# Patient Record
Sex: FEMALE | Race: WHITE | NOT HISPANIC OR LATINO | Employment: PART TIME | URBAN - METROPOLITAN AREA
[De-identification: names, ages, dates, MRNs, and addresses within clinical notes are randomized per-mention and may not be internally consistent; named-entity substitution may affect disease eponyms.]

---

## 2022-05-26 ENCOUNTER — APPOINTMENT (EMERGENCY)
Dept: ULTRASOUND IMAGING | Facility: HOSPITAL | Age: 22
End: 2022-05-26

## 2022-05-26 ENCOUNTER — HOSPITAL ENCOUNTER (EMERGENCY)
Facility: HOSPITAL | Age: 22
Discharge: HOME | End: 2022-05-27
Attending: EMERGENCY MEDICINE

## 2022-05-26 VITALS
HEART RATE: 76 BPM | TEMPERATURE: 98 F | RESPIRATION RATE: 18 BRPM | OXYGEN SATURATION: 98 % | DIASTOLIC BLOOD PRESSURE: 55 MMHG | SYSTOLIC BLOOD PRESSURE: 107 MMHG

## 2022-05-26 DIAGNOSIS — O03.4 RETAINED PRODUCTS OF CONCEPTION FOLLOWING ABORTION: Primary | ICD-10-CM

## 2022-05-26 LAB
ALBUMIN SERPL BCP-MCNC: 4.3 G/DL (ref 3.5–5)
ALP SERPL-CCNC: 83 U/L (ref 34–104)
ALT SERPL W P-5'-P-CCNC: 8 U/L (ref 7–52)
ANION GAP SERPL CALCULATED.3IONS-SCNC: 5 MMOL/L (ref 4–13)
APTT PPP: 30 SECONDS (ref 23–37)
AST SERPL W P-5'-P-CCNC: 14 U/L (ref 13–39)
BASOPHILS # BLD AUTO: 0.03 THOUSANDS/ΜL (ref 0–0.1)
BASOPHILS NFR BLD AUTO: 0 % (ref 0–1)
BILIRUB SERPL-MCNC: 0.51 MG/DL (ref 0.2–1)
BUN SERPL-MCNC: 10 MG/DL (ref 5–25)
CALCIUM SERPL-MCNC: 9 MG/DL (ref 8.4–10.2)
CHLORIDE SERPL-SCNC: 105 MMOL/L (ref 96–108)
CO2 SERPL-SCNC: 28 MMOL/L (ref 21–32)
CREAT SERPL-MCNC: 0.66 MG/DL (ref 0.6–1.3)
EOSINOPHIL # BLD AUTO: 0.23 THOUSAND/ΜL (ref 0–0.61)
EOSINOPHIL NFR BLD AUTO: 3 % (ref 0–6)
ERYTHROCYTE [DISTWIDTH] IN BLOOD BY AUTOMATED COUNT: 11.9 % (ref 11.6–15.1)
GFR SERPL CREATININE-BSD FRML MDRD: 128 ML/MIN/1.73SQ M
GLUCOSE SERPL-MCNC: 99 MG/DL (ref 65–140)
HCT VFR BLD AUTO: 36.8 % (ref 34.8–46.1)
HGB BLD-MCNC: 12.1 G/DL (ref 11.5–15.4)
IMM GRANULOCYTES # BLD AUTO: 0.02 THOUSAND/UL (ref 0–0.2)
IMM GRANULOCYTES NFR BLD AUTO: 0 % (ref 0–2)
INR PPP: 1.02 (ref 0.84–1.19)
LIPASE SERPL-CCNC: 27 U/L (ref 11–82)
LYMPHOCYTES # BLD AUTO: 2.54 THOUSANDS/ΜL (ref 0.6–4.47)
LYMPHOCYTES NFR BLD AUTO: 37 % (ref 14–44)
MCH RBC QN AUTO: 29.9 PG (ref 26.8–34.3)
MCHC RBC AUTO-ENTMCNC: 32.9 G/DL (ref 31.4–37.4)
MCV RBC AUTO: 91 FL (ref 82–98)
MONOCYTES # BLD AUTO: 0.41 THOUSAND/ΜL (ref 0.17–1.22)
MONOCYTES NFR BLD AUTO: 6 % (ref 4–12)
NEUTROPHILS # BLD AUTO: 3.57 THOUSANDS/ΜL (ref 1.85–7.62)
NEUTS SEG NFR BLD AUTO: 54 % (ref 43–75)
NRBC BLD AUTO-RTO: 0 /100 WBCS
PLATELET # BLD AUTO: 266 THOUSANDS/UL (ref 149–390)
PMV BLD AUTO: 9.7 FL (ref 8.9–12.7)
POTASSIUM SERPL-SCNC: 4.3 MMOL/L (ref 3.5–5.3)
PROT SERPL-MCNC: 7 G/DL (ref 6.4–8.4)
PROTHROMBIN TIME: 13.4 SECONDS (ref 11.6–14.5)
RBC # BLD AUTO: 4.05 MILLION/UL (ref 3.81–5.12)
SODIUM SERPL-SCNC: 138 MMOL/L (ref 135–147)
WBC # BLD AUTO: 6.8 THOUSAND/UL (ref 4.31–10.16)

## 2022-05-26 PROCEDURE — 86900 BLOOD TYPING SEROLOGIC ABO: CPT | Performed by: EMERGENCY MEDICINE

## 2022-05-26 PROCEDURE — 81025 URINE PREGNANCY TEST: CPT | Performed by: EMERGENCY MEDICINE

## 2022-05-26 PROCEDURE — 86901 BLOOD TYPING SEROLOGIC RH(D): CPT | Performed by: EMERGENCY MEDICINE

## 2022-05-26 PROCEDURE — 86850 RBC ANTIBODY SCREEN: CPT | Performed by: EMERGENCY MEDICINE

## 2022-05-26 PROCEDURE — 85025 COMPLETE CBC W/AUTO DIFF WBC: CPT | Performed by: EMERGENCY MEDICINE

## 2022-05-26 PROCEDURE — 83690 ASSAY OF LIPASE: CPT | Performed by: EMERGENCY MEDICINE

## 2022-05-26 PROCEDURE — 99285 EMERGENCY DEPT VISIT HI MDM: CPT | Performed by: EMERGENCY MEDICINE

## 2022-05-26 PROCEDURE — 85730 THROMBOPLASTIN TIME PARTIAL: CPT | Performed by: EMERGENCY MEDICINE

## 2022-05-26 PROCEDURE — 76856 US EXAM PELVIC COMPLETE: CPT

## 2022-05-26 PROCEDURE — 85610 PROTHROMBIN TIME: CPT | Performed by: EMERGENCY MEDICINE

## 2022-05-26 PROCEDURE — 99284 EMERGENCY DEPT VISIT MOD MDM: CPT

## 2022-05-26 PROCEDURE — 76830 TRANSVAGINAL US NON-OB: CPT

## 2022-05-26 PROCEDURE — 80053 COMPREHEN METABOLIC PANEL: CPT | Performed by: EMERGENCY MEDICINE

## 2022-05-26 PROCEDURE — 36415 COLL VENOUS BLD VENIPUNCTURE: CPT

## 2022-05-27 LAB
ABO GROUP BLD: NORMAL
ABO GROUP BLD: NORMAL
BACTERIA UR QL AUTO: ABNORMAL /HPF
BILIRUB UR QL STRIP: NEGATIVE
BLD GP AB SCN SERPL QL: NEGATIVE
CLARITY UR: CLEAR
COLOR UR: YELLOW
EXT PREG TEST URINE: NEGATIVE
EXT. CONTROL ED NAV: NORMAL
GLUCOSE UR STRIP-MCNC: NEGATIVE MG/DL
HGB UR QL STRIP.AUTO: ABNORMAL
KETONES UR STRIP-MCNC: NEGATIVE MG/DL
LEUKOCYTE ESTERASE UR QL STRIP: NEGATIVE
MUCOUS THREADS UR QL AUTO: ABNORMAL
NITRITE UR QL STRIP: NEGATIVE
NON-SQ EPI CELLS URNS QL MICRO: ABNORMAL /HPF
PH UR STRIP.AUTO: 6 [PH]
PROT UR STRIP-MCNC: NEGATIVE MG/DL
RBC #/AREA URNS AUTO: ABNORMAL /HPF
RH BLD: NEGATIVE
RH BLD: NEGATIVE
SP GR UR STRIP.AUTO: >=1.03 (ref 1–1.03)
SPECIMEN EXPIRATION DATE: NORMAL
UROBILINOGEN UR QL STRIP.AUTO: 0.2 E.U./DL
WBC #/AREA URNS AUTO: ABNORMAL /HPF

## 2022-05-27 PROCEDURE — 81001 URINALYSIS AUTO W/SCOPE: CPT | Performed by: EMERGENCY MEDICINE

## 2022-05-27 PROCEDURE — 36415 COLL VENOUS BLD VENIPUNCTURE: CPT | Performed by: EMERGENCY MEDICINE

## 2022-05-27 PROCEDURE — NC001 PR NO CHARGE: Performed by: OBSTETRICS & GYNECOLOGY

## 2022-05-27 RX ORDER — MISOPROSTOL 200 UG/1
800 TABLET ORAL ONCE
Status: COMPLETED | OUTPATIENT
Start: 2022-05-27 | End: 2022-05-27

## 2022-05-27 RX ADMIN — MISOPROSTOL 800 MCG: 200 TABLET ORAL at 01:08

## 2022-05-27 NOTE — CONSULTS
Consultation - Gynecology  Lorenzo Zhou 25 y o  female MRN: [id number]  Unit/Bed#: ED 08 Encounter: [id number]      Inpatient consult to Obstetrics / Gynecology  Consult performed by: Avelino Vega MD  Consult ordered by: Ge Mendoza MD        Chief Complaint   Patient presents with    Vaginal Bleeding     Pt had an  1 5 months ago, bleeding heavy since, uses a regular tampon 4 times a day, denies dizziness/sob, +cramping       History of Present Illness   Physician Requesting Consult: Ge Mendoza*  Reason for Consult / Principal Problem: vaginal bleeding after elective termination of pregnancy  Subspeciality: General GYN     HPI: Lorenzo Zhou is a 25 y o  female who presents with  Vaginal bleeding after elective termination of pregnancy aroun 8 weeks of pregnancy, 1 5 months ago  Patient states she received medical management and a Rhogam shot at time of her visit to the [facility]  Denies fever, chills, chest pain for SOB  Currently with mild vaginal bleeding  she has used 3 pads since 3 pm when she woke up  She states some clots when she use the restroom     Initially evaluation in the ED   Patient stable, afebrile  Hr , /50S  Hgb 12, pelvic US with The endometrial echo complex has an AP caliber of 15 mm  Thickened and heterogeneous with increased vascular flow, highly suspicious for retained products of conception  No abnormalities in adnexa are present  Review of Systems   Constitutional: Negative  HENT: Negative  Eyes: Negative  Respiratory: Negative  Cardiovascular: Negative  Gastrointestinal: Negative  Endocrine: Negative  Genitourinary: Positive for vaginal bleeding  Musculoskeletal: Negative  Allergic/Immunologic: Negative  Neurological: Negative  Hematological: Negative  Psychiatric/Behavioral: Negative  Historical Information   History reviewed  No pertinent past medical history    History reviewed  No pertinent surgical history  OB History   No obstetric history on file  Family History   Problem Relation Age of Onset    Diabetes type II Paternal Grandfather      Social History   Social History     Substance and Sexual Activity   Alcohol Use None     Social History     Substance and Sexual Activity   Drug Use Not on file     Social History     Tobacco Use   Smoking Status Not on file   Smokeless Tobacco Not on file       Meds/Allergies   No current facility-administered medications for this encounter  No Known Allergies    Objective   Vitals: Blood pressure 107/55, pulse 76, temperature 98 °F (36 7 °C), temperature source Oral, resp  rate 18, SpO2 98 %  There is no height or weight on file to calculate BMI  No intake or output data in the 24 hours ending 05/27/22 0025    Invasive Devices  Timeline    None                 Physical Exam  Constitutional:       Appearance: She is well-developed  HENT:      Head: Normocephalic and atraumatic  Eyes:      Conjunctiva/sclera: Conjunctivae normal       Pupils: Pupils are equal, round, and reactive to light  Cardiovascular:      Rate and Rhythm: Normal rate and regular rhythm  Heart sounds: Normal heart sounds  Pulmonary:      Effort: Pulmonary effort is normal       Breath sounds: Normal breath sounds  Abdominal:      General: Bowel sounds are normal       Palpations: Abdomen is soft  Genitourinary:     Vagina: Normal       Comments: Mild vaginal bleeding present, no active bleeding present with Valsalva maneuvers through external os  Her cervix is close/posterior  Musculoskeletal:         General: Normal range of motion  Cervical back: Normal range of motion and neck supple  Skin:     General: Skin is warm  Neurological:      Mental Status: She is alert and oriented to person, place, and time           Lab Results:   Recent Results (from the past 24 hour(s))   CBC and differential    Collection Time: 05/26/22  8:41 PM   Result Value Ref Range    WBC 6 80 4 31 - 10 16 Thousand/uL    RBC 4 05 3 81 - 5 12 Million/uL    Hemoglobin 12 1 11 5 - 15 4 g/dL    Hematocrit 36 8 34 8 - 46 1 %    MCV 91 82 - 98 fL    MCH 29 9 26 8 - 34 3 pg    MCHC 32 9 31 4 - 37 4 g/dL    RDW 11 9 11 6 - 15 1 %    MPV 9 7 8 9 - 12 7 fL    Platelets 758 941 - 574 Thousands/uL    nRBC 0 /100 WBCs    Neutrophils Relative 54 43 - 75 %    Immat GRANS % 0 0 - 2 %    Lymphocytes Relative 37 14 - 44 %    Monocytes Relative 6 4 - 12 %    Eosinophils Relative 3 0 - 6 %    Basophils Relative 0 0 - 1 %    Neutrophils Absolute 3 57 1 85 - 7 62 Thousands/µL    Immature Grans Absolute 0 02 0 00 - 0 20 Thousand/uL    Lymphocytes Absolute 2 54 0 60 - 4 47 Thousands/µL    Monocytes Absolute 0 41 0 17 - 1 22 Thousand/µL    Eosinophils Absolute 0 23 0 00 - 0 61 Thousand/µL    Basophils Absolute 0 03 0 00 - 0 10 Thousands/µL   Comprehensive metabolic panel    Collection Time: 05/26/22  8:41 PM   Result Value Ref Range    Sodium 138 135 - 147 mmol/L    Potassium 4 3 3 5 - 5 3 mmol/L    Chloride 105 96 - 108 mmol/L    CO2 28 21 - 32 mmol/L    ANION GAP 5 4 - 13 mmol/L    BUN 10 5 - 25 mg/dL    Creatinine 0 66 0 60 - 1 30 mg/dL    Glucose 99 65 - 140 mg/dL    Calcium 9 0 8 4 - 10 2 mg/dL    AST 14 13 - 39 U/L    ALT 8 7 - 52 U/L    Alkaline Phosphatase 83 34 - 104 U/L    Total Protein 7 0 6 4 - 8 4 g/dL    Albumin 4 3 3 5 - 5 0 g/dL    Total Bilirubin 0 51 0 20 - 1 00 mg/dL    eGFR 128 ml/min/1 73sq m   Lipase    Collection Time: 05/26/22  8:41 PM   Result Value Ref Range    Lipase 27 11 - 82 u/L   Protime-INR    Collection Time: 05/26/22 11:22 PM   Result Value Ref Range    Protime 13 4 11 6 - 14 5 seconds    INR 1 02 0 84 - 1 19   APTT    Collection Time: 05/26/22 11:22 PM   Result Value Ref Range    PTT 30 23 - 37 seconds       Imaging Studies: I have personally reviewed pertinent reports  EKG, Pathology, and Other Studies: I have personally reviewed pertinent reports  Assessment/Plan     Incomplete    · Status post  Medical  1 5 months ago, and RhoGAM administration due to Rh-negative status  · Hemoglobin 12 2 , vitals stable, mild vaginal bleeding present, cervix is close  · Have discussed with patient expectant management versus repeat medical management versus surgical management  We have discussed risks and benefits of each pathway  At this time patient desire repeat medical management  · One hundred mcg of Cytotec has been placed intravaginally  · Will follow-up in the office in 1 week for further ultrasound and contraception discussion, patient is considering LARCs  · We have discussed as well possible failure of medical management and requirement for D&E   · Patient voiced her understanding and agreed  All questions have been answered to her satisfaction  Code Status: No Order  Advance Directive and Living Will:      Power of :    POLST:      Counseling / Coordination of Care  Total floor / unit time spent today45 minutes  minutes  Greater than 50% of total time was spent with the patient and / or family counseling and / or coordination of care   Tahmina Wan MD  2022  12:25 AM

## 2022-05-27 NOTE — ED PROVIDER NOTES
- Monitor for signs of withdrawal  - Addiction psychiatry consulted - motivational interviewing and resources provided, appreciate help   History  Chief Complaint   Patient presents with    Vaginal Bleeding     Pt had an  1 5 months ago, bleeding heavy since, uses a regular tampon 4 times a day, denies dizziness/sob, +cramping     79-year-old female presents with continuous vaginal bleeding for over a month after she had a medication induced , reports that she is going through 1-2 tampons a day, patient denies any fever, headache, dizziness, chest pain, cough, shortness of breath, nausea, vomiting, reports that she has had an associated abdominal pain that is right upper and right lower quadrant pain, no pain when she urinates or hematuria, patient reports intermittent constipation and diarrhea, reports she drinks occasionally, uses a vape pen, does not use any other drugs  None       History reviewed  No pertinent past medical history  History reviewed  No pertinent surgical history  Family History   Problem Relation Age of Onset    Diabetes type II Paternal Grandfather      I have reviewed and agree with the history as documented  E-Cigarette/Vaping     E-Cigarette/Vaping Substances          Review of Systems   Constitutional: Negative for fever  HENT: Negative for congestion  Eyes: Negative for visual disturbance  Respiratory: Negative for cough and shortness of breath  Cardiovascular: Negative for chest pain  Gastrointestinal: Positive for abdominal pain, constipation and diarrhea  Negative for nausea and vomiting  Endocrine: Negative for polyuria  Genitourinary: Positive for vaginal bleeding  Negative for dysuria and hematuria  Musculoskeletal: Negative for myalgias  Neurological: Negative for dizziness and headaches  Physical Exam  Physical Exam  Vitals and nursing note reviewed  Constitutional:       Appearance: Normal appearance  HENT:      Head: Normocephalic and atraumatic        Right Ear: External ear normal       Left Ear: External ear normal       Mouth/Throat:      Mouth: Mucous membranes are moist       Pharynx: Oropharynx is clear  Eyes:      Extraocular Movements: Extraocular movements intact  Conjunctiva/sclera: Conjunctivae normal    Cardiovascular:      Rate and Rhythm: Normal rate and regular rhythm  Heart sounds: Normal heart sounds  Pulmonary:      Effort: Pulmonary effort is normal       Breath sounds: Normal breath sounds  Abdominal:      General: Abdomen is flat  There is no distension  Palpations: Abdomen is soft  Musculoskeletal:         General: No deformity  Normal range of motion  Cervical back: Normal range of motion  Skin:     General: Skin is warm and dry  Neurological:      General: No focal deficit present  Mental Status: She is alert        Gait: Gait normal    Psychiatric:         Mood and Affect: Mood normal          Behavior: Behavior normal          Vital Signs  ED Triage Vitals [05/26/22 2036]   Temperature Pulse Respirations Blood Pressure SpO2   98 °F (36 7 °C) 102 18 100/55 98 %      Temp Source Heart Rate Source Patient Position - Orthostatic VS BP Location FiO2 (%)   Oral Monitor Sitting Left arm --      Pain Score       --           Vitals:    05/26/22 2036 05/26/22 2330   BP: 100/55 107/55   Pulse: 102 76   Patient Position - Orthostatic VS: Sitting          Visual Acuity      ED Medications  Medications   misoprostol (CYTOTEC) tablet 800 mcg (800 mcg Oral Given by Other 5/27/22 0108)       Diagnostic Studies  Results Reviewed     Procedure Component Value Units Date/Time    Urine Microscopic [[id number]]  (Abnormal) Collected: 05/27/22 0042    Lab Status: Final result Specimen: Urine, Clean Catch Updated: 05/27/22 0243     RBC, UA 2-4 /hpf      WBC, UA None Seen /hpf      Epithelial Cells Occasional /hpf      Bacteria, UA Occasional /hpf      MUCUS THREADS Occasional    UA (URINE) with reflex to Scope [[id number]]  (Abnormal) Collected: 05/27/22 0042    Lab Status: Final result Specimen: Urine, Clean Catch Updated: 05/27/22 0127     Color, UA Yellow     Clarity, UA Clear     Specific Gravity, UA >=1 030     pH, UA 6 0     Leukocytes, UA Negative     Nitrite, UA Negative     Protein, UA Negative mg/dl      Glucose, UA Negative mg/dl      Ketones, UA Negative mg/dl      Urobilinogen, UA 0 2 E U /dl      Bilirubin, UA Negative     Blood, UA Large    POCT pregnancy, urine [[id number]]  (Normal) Resulted: 05/27/22 0050    Lab Status: Final result Updated: 05/27/22 0050     EXT PREG TEST UR (Ref: Negative) Negative     Control Valid    Protime-INR [[id number]]  (Normal) Collected: 05/26/22 2322    Lab Status: Final result Specimen: Blood from Arm, Right Updated: 05/26/22 2357     Protime 13 4 seconds      INR 1 02    APTT [[id number]]  (Normal) Collected: 05/26/22 2322    Lab Status: Final result Specimen: Blood from Arm, Right Updated: 05/26/22 2357     PTT 30 seconds     Comprehensive metabolic panel [[id number]] Collected: 05/26/22 2041    Lab Status: Final result Specimen: Blood from Arm, Right Updated: 05/26/22 2103     Sodium 138 mmol/L      Potassium 4 3 mmol/L      Chloride 105 mmol/L      CO2 28 mmol/L      ANION GAP 5 mmol/L      BUN 10 mg/dL      Creatinine 0 66 mg/dL      Glucose 99 mg/dL      Calcium 9 0 mg/dL      AST 14 U/L      ALT 8 U/L      Alkaline Phosphatase 83 U/L      Total Protein 7 0 g/dL      Albumin 4 3 g/dL      Total Bilirubin 0 51 mg/dL      eGFR 128 ml/min/1 73sq m     Narrative:      [place] guidelines for Chronic Kidney Disease (CKD):     Stage 1 with normal or high GFR (GFR > 90 mL/min/1 73 square meters)    Stage 2 Mild CKD (GFR = 60-89 mL/min/1 73 square meters)    Stage 3A Moderate CKD (GFR = 45-59 mL/min/1 73 square meters)    Stage 3B Moderate CKD (GFR = 30-44 mL/min/1 73 square meters)    Stage 4 Severe CKD (GFR = 15-29 mL/min/1 73 square meters)    Stage 5 End Stage CKD (GFR <15 mL/min/1 73 square meters)  Note: GFR calculation is accurate only with a steady state creatinine    Lipase [[id number]]  (Normal) Collected: 22    Lab Status: Final result Specimen: Blood from Arm, Right Updated: 22     Lipase 27 u/L     CBC and differential [[id number]] Collected: 22    Lab Status: Final result Specimen: Blood from Arm, Right Updated: 22     WBC 6 80 Thousand/uL      RBC 4 05 Million/uL      Hemoglobin 12 1 g/dL      Hematocrit 36 8 %      MCV 91 fL      MCH 29 9 pg      MCHC 32 9 g/dL      RDW 11 9 %      MPV 9 7 fL      Platelets 068 Thousands/uL      nRBC 0 /100 WBCs      Neutrophils Relative 54 %      Immat GRANS % 0 %      Lymphocytes Relative 37 %      Monocytes Relative 6 %      Eosinophils Relative 3 %      Basophils Relative 0 %      Neutrophils Absolute 3 57 Thousands/µL      Immature Grans Absolute 0 02 Thousand/uL      Lymphocytes Absolute 2 54 Thousands/µL      Monocytes Absolute 0 41 Thousand/µL      Eosinophils Absolute 0 23 Thousand/µL      Basophils Absolute 0 03 Thousands/µL                  US pelvis complete w transvaginal   Final Result by Adelaide Puga DO ([id number])       Thickened and heterogeneous endometrium with increased vascular flow, highly suspicious for retained products of conception  Dominant follicle in the right ovary, as described; bilateral ovaries otherwise grossly unremarkable      Other findings as above  Workstation performed: [id number]                    Procedures  Procedures         ED Course           MDM  Number of Diagnoses or Management Options  Retained products of conception following   Diagnosis management comments: Patient is deferring a pelvic exam, would prefer to have it done with her gynecologist       Patient care signed out to Dr Dionicia Ormond for further management, pending obstetric evaluation for retained products of conception        Disposition  Final diagnoses:   Retained products of conception following      Time reflects when diagnosis was documented in both MDM as applicable and the Disposition within this note     Time User Action Codes Description Comment    2022 11:26 PM Alberto Rivas Add [O03 4] Retained products of conception following        ED Disposition     ED Disposition   Discharge    Condition   --    Date/Time   Fri May 27, 2022  1:34 AM    Comment   Zulay Dixon discharge to home/self care  Follow-up Information     Follow up With Specialties Details Why Contact Info Additional [address] [facility] Obstetrics and Gynecology Schedule an appointment as soon as possible for a visit in 1 week(s)  [address] [address]  [place], [place], [address] [phone]          There are no discharge medications for this patient  No discharge procedures on file      PDMP Review     None          ED Provider  Electronically Signed by           Nathan Urrutia MD  [id number]

## 2022-05-31 ENCOUNTER — TELEPHONE (OUTPATIENT)
Dept: PEDIATRICS CLINIC | Facility: CLINIC | Age: 22
End: 2022-05-31

## 2022-06-25 NOTE — TELEPHONE ENCOUNTER
06/25/22 6:00 PM     Thank you for your request  Your request has been received, reviewed, and the patient chart updated  The PCP has successfully been removed with a patient attribution note  This message will now be completed      Thank you  Dylan Marie

## 2023-04-20 ENCOUNTER — HOSPITAL ENCOUNTER (EMERGENCY)
Facility: HOSPITAL | Age: 23
Discharge: HOME | End: 2023-04-20
Attending: EMERGENCY MEDICINE

## 2023-04-20 VITALS
TEMPERATURE: 98.3 F | DIASTOLIC BLOOD PRESSURE: 64 MMHG | HEART RATE: 130 BPM | SYSTOLIC BLOOD PRESSURE: 136 MMHG | OXYGEN SATURATION: 98 % | RESPIRATION RATE: 20 BRPM
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
## 2023-04-20 DIAGNOSIS — T14.8XXA ABRASION: ICD-10-CM

## 2023-04-20 RX ORDER — GINSENG 100 MG
1 CAPSULE ORAL ONCE
Status: COMPLETED | OUTPATIENT
Start: 2023-04-20 | End: 2023-04-20

## 2023-04-20 RX ADMIN — BACITRACIN ZINC 1 LARGE APPLICATION: 500 OINTMENT TOPICAL at 03:33
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
